# Patient Record
Sex: FEMALE | Race: WHITE
[De-identification: names, ages, dates, MRNs, and addresses within clinical notes are randomized per-mention and may not be internally consistent; named-entity substitution may affect disease eponyms.]

---

## 2017-12-19 ENCOUNTER — RX ONLY (RX ONLY)
Age: 37
End: 2017-12-19

## 2017-12-19 ENCOUNTER — DOCTOR'S OFFICE (OUTPATIENT)
Dept: URBAN - NONMETROPOLITAN AREA CLINIC 1 | Facility: CLINIC | Age: 37
Setting detail: OPHTHALMOLOGY
End: 2017-12-19
Payer: COMMERCIAL

## 2017-12-19 DIAGNOSIS — H52.13: ICD-10-CM

## 2017-12-19 DIAGNOSIS — Z01.00: ICD-10-CM

## 2017-12-19 PROCEDURE — 92014 COMPRE OPH EXAM EST PT 1/>: CPT | Performed by: OPTOMETRIST

## 2017-12-19 PROCEDURE — 92310 CONTACT LENS FITTING OU: CPT | Performed by: OPTOMETRIST

## 2017-12-19 ASSESSMENT — REFRACTION_CURRENTRX
OS_OVR_VA: 20/
OD_OVR_VA: 20/
OS_OVR_VA: 20/
OD_OVR_VA: 20/
OS_OVR_VA: 20/
OD_OVR_VA: 20/

## 2017-12-19 ASSESSMENT — REFRACTION_MANIFEST
OD_VA2: 20/
OS_VA1: 20/
OS_VA2: 20/
OD_VA3: 20/
OD_VA3: 20/
OD_VA1: 20/
OS_VA3: 20/
OU_VA: 20/
OU_VA: 20/
OS_VA2: 20/
OD_VA1: 20/
OS_VA1: 20/
OD_VA2: 20/
OS_VA3: 20/

## 2017-12-19 ASSESSMENT — VISUAL ACUITY
OS_BCVA: 20/20
OD_BCVA: 20/20

## 2017-12-19 ASSESSMENT — AXIALLENGTH_DERIVED: OS_AL: 23.7587

## 2017-12-19 ASSESSMENT — REFRACTION_OUTSIDERX
OS_CYLINDER: -0.50
OS_VA2: 20/20
OD_VA1: 20/20
OD_VA3: 20/
OD_SPHERE: -1.25
OS_VA1: 20/20
OS_VA3: 20/
OD_VA2: 20/20
OD_CYLINDER: SPH
OS_AXIS: 155
OU_VA: 20/
OS_SPHERE: -1.00

## 2017-12-19 ASSESSMENT — KERATOMETRY
METHOD_AUTO_MANUAL: AUTO
OS_AXISANGLE_DEGREES: 172
OD_K1POWER_DIOPTERS: 43.50
OD_K2POWER_DIOPTERS: 44.50
OS_K2POWER_DIOPTERS: 44.75
OS_K1POWER_DIOPTERS: 44.00
OD_AXISANGLE_DEGREES: 016

## 2017-12-19 ASSESSMENT — REFRACTION_AUTOREFRACTION
OS_AXIS: 157
OS_SPHERE: -1.00
OD_SPHERE: -1.25
OS_CYLINDER: -0.50

## 2017-12-19 ASSESSMENT — SPHEQUIV_DERIVED: OS_SPHEQUIV: -1.25

## 2017-12-19 ASSESSMENT — CONFRONTATIONAL VISUAL FIELD TEST (CVF)
OD_FINDINGS: FULL
OS_FINDINGS: FULL

## 2018-01-04 ENCOUNTER — OPTICAL OFFICE (OUTPATIENT)
Dept: URBAN - NONMETROPOLITAN AREA CLINIC 4 | Facility: CLINIC | Age: 38
Setting detail: OPHTHALMOLOGY
End: 2018-01-04
Payer: COMMERCIAL

## 2018-01-04 DIAGNOSIS — H52.13: ICD-10-CM

## 2018-01-04 PROCEDURE — S0500 DISPOS CONT LENS: HCPCS | Performed by: OPTOMETRIST

## 2024-08-02 ENCOUNTER — OFFICE VISIT (OUTPATIENT)
Dept: URGENT CARE | Facility: CLINIC | Age: 44
End: 2024-08-02
Payer: COMMERCIAL

## 2024-08-02 VITALS
SYSTOLIC BLOOD PRESSURE: 122 MMHG | TEMPERATURE: 98.4 F | RESPIRATION RATE: 17 BRPM | HEIGHT: 64 IN | HEART RATE: 91 BPM | WEIGHT: 170 LBS | BODY MASS INDEX: 29.02 KG/M2 | DIASTOLIC BLOOD PRESSURE: 62 MMHG | OXYGEN SATURATION: 98 %

## 2024-08-02 DIAGNOSIS — B35.3 TINEA PEDIS OF LEFT FOOT: Primary | ICD-10-CM

## 2024-08-02 PROCEDURE — S9083 URGENT CARE CENTER GLOBAL: HCPCS

## 2024-08-02 PROCEDURE — G0382 LEV 3 HOSP TYPE B ED VISIT: HCPCS

## 2024-08-02 RX ORDER — AMLODIPINE BESYLATE 2.5 MG/1
2.5 TABLET ORAL DAILY
COMMUNITY
Start: 2024-07-26 | End: 2024-10-24

## 2024-08-02 RX ORDER — CLOTRIMAZOLE AND BETAMETHASONE DIPROPIONATE 10; .64 MG/G; MG/G
CREAM TOPICAL 2 TIMES DAILY
Qty: 45 G | Refills: 0 | Status: SHIPPED | OUTPATIENT
Start: 2024-08-02

## 2024-08-02 NOTE — PROGRESS NOTES
"  [facility]        NAME: Sarah Ayoub is a 44 y.o. female  : 1980    MRN: [id number]  DATE: 2024  TIME: 10:57 AM    Assessment and Plan   Tinea pedis of left foot [B35.3]  1. Tinea pedis of left foot  clotrimazole-betamethasone (LOTRISONE) 1-0.05 % cream        Skin findings concerning for tinea pedis and will treat with clotrimazole-betamethasone. Encouraged continued supportive measures.  Follow up with PCP in 3-5 days or proceed to emergency department for worsening symptoms.  Patient verbalized understanding of instructions given.       Patient Instructions     Patient Instructions   Use cream as prescribed  Decrease moisture  Follow up with PCP in 3-5 days.  Proceed to  ER if symptoms worsen.    If tests have been performed at [facility], our office will contact you with results if changes need to be made to the care plan discussed with you at the visit.  You can review your full results on [facility] MyChart.      Patient Education     Ringworm, athlete's foot, and jock itch   The Basics   Written by the doctors and editors at [facility]   What are ringworm, athlete's foot, and jock itch? -- These are all skin infections caused by a fungus. These types of fungal infections are also called \"tinea.\"  Some people call these fungal infections \"ringworm.\" This is because they often cause a ring-shaped, red, itchy rash on the skin (picture 1). But a ring-shaped rash is not always there. Depending on where the infection is, it can look different:   People with athlete's foot might instead have moist, raw skin between their toes, or flaking skin on the bottoms of their feet (picture 2 and picture 3).   People with jock itch often just have a rash on their groin. It usually starts in the fold where the thigh meets the groin area .   Sometimes, especially in children, the fungus can infect the scalp. On the scalp, the infection can look like a bald spot or a round flaky patch of skin (picture 5 " and picture 6).  How did I get a fungal infection? -- You can catch fungal infections through skin-to-skin contact with a person who is infected. You can also catch them from an infected dog or cat.  You can also get the infections from places where the fungus might be, such as:   A shower stall   A locker room floor   The area near a pool  If you have a fungal infection on 1 part of your body, you can also spread it to other parts. For instance, a fungal infection on your feet could spread to your groin.  How are fungal infections treated? -- The treatment for a fungal infection depends on which body part is affected:   If you have a fungal infection on your scalp, you must take pills to kill the fungus. Treatment for scalp infections usually lasts 1 to 3 months.   If you have a fungal infection on your feet, groin, or another body part, most of the time, you will not need pills. Instead, you can use a special gel, cream, lotion, or powder to kill the fungus. Treatment with these products lasts 2 to 4 weeks.   If you have a fungal infection on your groin and on your feet, you must treat both infections at the same time. If you don't, the infection on your feet can spread to your groin again.  What problems should I watch for? -- If you are being treated for a fungal infection, call your doctor or nurse for advice if:   Your fungal infection spreads.   You have any of the following symptoms:   Fever of 100.4°F (38°C) or higher   Chills   Swelling, redness, or warmth around the infected area   Pain when touching the area   Your fungal infection doesn't go away after treatment.  How do I keep from getting a fungal infection again? -- If someone in your home has had a fungal infection on their scalp:   Get rid of any barr, brushes, barrettes, or other hair products that could have the fungus on them.   Make sure that a doctor or nurse checks everyone in the house for a fungal infection on the scalp. The doctor or  nurse might also suggest that everyone else in the house use an antifungal shampoo for a few weeks.   If the fungal infection might have come from a pet, have the pet checked by a vet.  Some other general tips to prevent fungal infections:   Do not share unwashed clothes, sports gear, or towels with other people.   Always wear slippers or sandals when at the gym, pool, or other public areas. This includes public showers.   Change your socks and underwear at least once a day.   Keep your skin clean and dry. Always dry yourself well after swimming or showering.  All topics are updated as new evidence becomes available and our peer review process is complete.  This topic retrieved from [employer] on: May 16, 2024.  Topic 75365 Version 11.0  Release: 32.4.3 - C32.135  © 2024 UpToDate, Inc. and/or its affiliates. All rights reserved.  picture 1: Ringworm     Ringworm can cause a ring-shaped, red, itchy rash on the skin.  Reproduced with permission from: www.[employer]. Copyright [employer]. All rights reserved.  Graphic 381324 Version 2.0  picture 2: Athlete's foot     Athlete's foot can cause moist, raw skin between the toes.  Reproduced with permission from: www.[employer]. Copyright [employer]. All rights reserved.  Graphic 305617 Version 2.0  picture 3: Athlete's foot     People with athlete's foot often have flaky skin on the bottoms of their feet.  Graphic 529349 Version 1.0  picture 5: Fungal infection of the scalp     This photo shows tinea capitis, a fungal infection of the scalp.  Reproduced with permission from: www.skinsight.com. Copyright [employer]. All rights reserved.  Graphic 776356 Version 3.0  picture 6: Fungal infection of the scalp     A fungal infection on the scalp can cause scaly patches and hair loss.  Reproduced with permission from: www.[employer]. Copyright [employer]. All rights reserved.  Graphic 628833 Version 2.0  Consumer Information Use and Disclaimer   Disclaimer: This generalized  information is a limited summary of diagnosis, treatment, and/or medication information. It is not meant to be comprehensive and should be used as a tool to help the user understand and/or assess potential diagnostic and treatment options. It does NOT include all information about conditions, treatments, medications, side effects, or risks that may apply to a specific patient. It is not intended to be medical advice or a substitute for the medical advice, diagnosis, or treatment of a health care provider based on the health care provider's examination and assessment of a patient's specific and unique circumstances. Patients must speak with a health care provider for complete information about their health, medical questions, and treatment options, including any risks or benefits regarding use of medications. This information does not endorse any treatments or medications as safe, effective, or approved for treating a specific patient. UpToDate, Inc. and its affiliates disclaim any warranty or liability relating to this information or the use thereof.The use of this information is governed by the Terms of Use, available at https://www.FOBO.com/en/know/clinical-effectiveness-terms. 2024© UpToDate, Inc. and its affiliates and/or licensors. All rights reserved.  Copyright   © 2024 UpToDate, Inc. and/or its affiliates. All rights reserved.        Chief Complaint     Chief Complaint   Patient presents with    Rash     Rash on left great toe X 2 months. Noticed past 2 weeks spreading under toe. Using neosporin cream . Rash is itchy         History of Present Illness       44-year-old female with a past medical history significant for hypertension presents with rash to left foot, specifically left great toe.  Patient reports noticing rash approximately 2 months ago however acutely worsened x 2 weeks.  Reports itching and so she has been applying topical Neosporin ointment.  Reports some dryness and flaking of skin.  " Denies fever, chills, or flulike symptoms.         Review of Systems   Review of Systems   Constitutional:  Negative for chills and fever.   HENT:  Negative for congestion, ear discharge, ear pain, rhinorrhea and sore throat.    Eyes:  Negative for discharge.   Respiratory:  Negative for cough and shortness of breath.    Cardiovascular:  Negative for chest pain.   Gastrointestinal:  Negative for abdominal pain, diarrhea, nausea and vomiting.   Musculoskeletal:  Negative for arthralgias and myalgias.   Skin:  Positive for rash.   Neurological:  Negative for weakness and numbness.         Current Medications       Current Outpatient Medications:     amLODIPine (NORVASC) 2.5 mg tablet, Take 2.5 mg by mouth daily, Disp: , Rfl:     clotrimazole-betamethasone (LOTRISONE) 1-0.05 % cream, Apply topically 2 (two) times a day, Disp: 45 g, Rfl: 0    Current Allergies     Allergies as of 08/02/2024    (No Known Allergies)            The following portions of the patient's history were reviewed and updated as appropriate: allergies, current medications, past family history, past medical history, past social history, past surgical history and problem list.     Past Medical History:   Diagnosis Date    Hypertension        Past Surgical History:   Procedure Laterality Date    HYSTERECTOMY         Family History   Problem Relation Age of Onset    Hypertension Mother     Cancer Father          Medications have been verified.        Objective   /62   Pulse 91   Temp 98.4 °F (36.9 °C)   Resp 17   Ht 5' 4\" (1.626 m)   Wt 77.1 kg (170 lb)   SpO2 98%   BMI 29.18 kg/m²   No LMP recorded. Patient has had a hysterectomy.       Physical Exam     Physical Exam  Vitals and nursing note reviewed.   Constitutional:       General: She is not in acute distress.     Appearance: She is not toxic-appearing.   HENT:      Head: Normocephalic.   Eyes:      Conjunctiva/sclera: Conjunctivae normal.   Pulmonary:      Effort: Pulmonary effort " is normal.   Musculoskeletal:         General: Normal range of motion.   Skin:     General: Skin is warm and dry.      Comments: Erythematous and dry/flaking skin noted to left great toe that extends between webbing and over medial aspect of toe without drainage    Neurological:      Mental Status: She is alert and oriented to person, place, and time.      Sensory: Sensation is intact.      Motor: Motor function is intact.      Gait: Gait is intact.   Psychiatric:         Mood and Affect: Mood normal.         Behavior: Behavior normal.

## 2024-08-02 NOTE — PATIENT INSTRUCTIONS
"Use cream as prescribed  Decrease moisture  Follow up with PCP in 3-5 days.  Proceed to  ER if symptoms worsen.    If tests have been performed at [facility], our office will contact you with results if changes need to be made to the care plan discussed with you at the visit.  You can review your full results on [facility] MyChart.      Patient Education     Ringworm, athlete's foot, and jock itch   The Basics   Written by the doctors and editors at [facility]   What are ringworm, athlete's foot, and jock itch? -- These are all skin infections caused by a fungus. These types of fungal infections are also called \"tinea.\"  Some people call these fungal infections \"ringworm.\" This is because they often cause a ring-shaped, red, itchy rash on the skin (picture 1). But a ring-shaped rash is not always there. Depending on where the infection is, it can look different:   People with athlete's foot might instead have moist, raw skin between their toes, or flaking skin on the bottoms of their feet (picture 2 and picture 3).   People with jock itch often just have a rash on their groin. It usually starts in the fold where the thigh meets the groin area .   Sometimes, especially in children, the fungus can infect the scalp. On the scalp, the infection can look like a bald spot or a round flaky patch of skin (picture 5 and picture 6).  How did I get a fungal infection? -- You can catch fungal infections through skin-to-skin contact with a person who is infected. You can also catch them from an infected dog or cat.  You can also get the infections from places where the fungus might be, such as:   A shower stall   A locker room floor   The area near a pool  If you have a fungal infection on 1 part of your body, you can also spread it to other parts. For instance, a fungal infection on your feet could spread to your groin.  How are fungal infections treated? -- The treatment for a fungal infection depends on which body part is " affected:   If you have a fungal infection on your scalp, you must take pills to kill the fungus. Treatment for scalp infections usually lasts 1 to 3 months.   If you have a fungal infection on your feet, groin, or another body part, most of the time, you will not need pills. Instead, you can use a special gel, cream, lotion, or powder to kill the fungus. Treatment with these products lasts 2 to 4 weeks.   If you have a fungal infection on your groin and on your feet, you must treat both infections at the same time. If you don't, the infection on your feet can spread to your groin again.  What problems should I watch for? -- If you are being treated for a fungal infection, call your doctor or nurse for advice if:   Your fungal infection spreads.   You have any of the following symptoms:   Fever of 100.4°F (38°C) or higher   Chills   Swelling, redness, or warmth around the infected area   Pain when touching the area   Your fungal infection doesn't go away after treatment.  How do I keep from getting a fungal infection again? -- If someone in your home has had a fungal infection on their scalp:   Get rid of any barr, brushes, barrettes, or other hair products that could have the fungus on them.   Make sure that a doctor or nurse checks everyone in the house for a fungal infection on the scalp. The doctor or nurse might also suggest that everyone else in the house use an antifungal shampoo for a few weeks.   If the fungal infection might have come from a pet, have the pet checked by a vet.  Some other general tips to prevent fungal infections:   Do not share unwashed clothes, sports gear, or towels with other people.   Always wear slippers or sandals when at the gym, pool, or other public areas. This includes public showers.   Change your socks and underwear at least once a day.   Keep your skin clean and dry. Always dry yourself well after swimming or showering.  All topics are updated as new evidence becomes  available and our peer review process is complete.  This topic retrieved from [employer] on: May 16, 2024.  Topic 09241 Version 11.0  Release: 32.4.3 - C32.135  © 2024 UpToDate, Inc. and/or its affiliates. All rights reserved.  picture 1: Ringworm     Ringworm can cause a ring-shaped, red, itchy rash on the skin.  Reproduced with permission from: www.[employer]. Copyright [employer]. All rights reserved.  Graphic 738708 Version 2.0  picture 2: Athlete's foot     Athlete's foot can cause moist, raw skin between the toes.  Reproduced with permission from: www.[employer]. Copyright [employer]. All rights reserved.  Graphic 465006 Version 2.0  picture 3: Athlete's foot     People with athlete's foot often have flaky skin on the bottoms of their feet.  Graphic 185259 Version 1.0  picture 5: Fungal infection of the scalp     This photo shows tinea capitis, a fungal infection of the scalp.  Reproduced with permission from: www.[employer]. Copyright [employer]. All rights reserved.  Graphic 373666 Version 3.0  picture 6: Fungal infection of the scalp     A fungal infection on the scalp can cause scaly patches and hair loss.  Reproduced with permission from: www.[employer]. Copyright [employer]. All rights reserved.  Graphic 481169 Version 2.0  Consumer Information Use and Disclaimer   Disclaimer: This generalized information is a limited summary of diagnosis, treatment, and/or medication information. It is not meant to be comprehensive and should be used as a tool to help the user understand and/or assess potential diagnostic and treatment options. It does NOT include all information about conditions, treatments, medications, side effects, or risks that may apply to a specific patient. It is not intended to be medical advice or a substitute for the medical advice, diagnosis, or treatment of a health care provider based on the health care provider's examination and assessment of a patient's specific and unique circumstances.  Patients must speak with a health care provider for complete information about their health, medical questions, and treatment options, including any risks or benefits regarding use of medications. This information does not endorse any treatments or medications as safe, effective, or approved for treating a specific patient. UpToDate, Inc. and its affiliates disclaim any warranty or liability relating to this information or the use thereof.The use of this information is governed by the Terms of Use, available at https://www.[employer].com/en/know/clinical-effectiveness-terms. 2024© UpToDate, Inc. and its affiliates and/or licensors. All rights reserved.  Copyright   © 2024 UpToDate, Inc. and/or its affiliates. All rights reserved.

## 2025-01-25 ENCOUNTER — OFFICE VISIT (OUTPATIENT)
Dept: URGENT CARE | Facility: CLINIC | Age: 45
End: 2025-01-25
Payer: COMMERCIAL

## 2025-01-25 VITALS
RESPIRATION RATE: 18 BRPM | DIASTOLIC BLOOD PRESSURE: 96 MMHG | WEIGHT: 180 LBS | TEMPERATURE: 97 F | SYSTOLIC BLOOD PRESSURE: 128 MMHG | BODY MASS INDEX: 30.73 KG/M2 | HEIGHT: 64 IN | OXYGEN SATURATION: 91 % | HEART RATE: 91 BPM

## 2025-01-25 DIAGNOSIS — J01.00 ACUTE NON-RECURRENT MAXILLARY SINUSITIS: Primary | ICD-10-CM

## 2025-01-25 PROCEDURE — G0382 LEV 3 HOSP TYPE B ED VISIT: HCPCS

## 2025-01-25 PROCEDURE — S9083 URGENT CARE CENTER GLOBAL: HCPCS

## 2025-01-25 RX ORDER — METHYLPREDNISOLONE 4 MG/1
TABLET ORAL
Qty: 1 EACH | Refills: 0 | Status: SHIPPED | OUTPATIENT
Start: 2025-01-25

## 2025-01-25 RX ORDER — FLUTICASONE PROPIONATE 50 MCG
2 SPRAY, SUSPENSION (ML) NASAL DAILY
Qty: 11.1 ML | Refills: 0 | Status: SHIPPED | OUTPATIENT
Start: 2025-01-25

## 2025-01-25 NOTE — PROGRESS NOTES
[facility]        NAME: Sarah Ayoub is a 44 y.o. female  : 1980    MRN: [id number]  DATE: 2025  TIME: 11:51 AM    Assessment and Plan   Acute non-recurrent maxillary sinusitis [J01.00]  1. Acute non-recurrent maxillary sinusitis  fluticasone (FLONASE) 50 mcg/act nasal spray    methylPREDNISolone 4 MG tablet therapy pack            Patient Instructions       Follow up with PCP in 3-5 days.  Proceed to  ER if symptoms worsen.    If tests are performed, our office will contact you with results only if changes need to made to the care plan discussed with you at the visit. You can review your full results on [facility].    Chief Complaint     Chief Complaint   Patient presents with   • Ear Fullness     C/o bilateral ear fullness, left ear is worse. Congestion. Onset x2 weeks ago.          History of Present Illness       44-year-old female presents with c/o bilateral ear fullness, left ear is worse. Congestion. Onset x2 weeks ago.  Was seen by family practice for this issue and was diagnosed with congestion and recommended to take Claritin daily.  Has been on Claritin for the last week with no improvement.  Denies any fevers or chills and is eating and drinking normally.  Ports mild cough complaints from postnasal drip but otherwise states she feels well    Ear Fullness   Associated symptoms include headaches and rhinorrhea. Pertinent negatives include no abdominal pain, coughing, diarrhea, sore throat or vomiting.       Review of Systems   Review of Systems   Constitutional:  Negative for appetite change, chills, fatigue and fever.   HENT:  Positive for congestion, ear pain, postnasal drip, rhinorrhea, sinus pressure and sinus pain. Negative for sore throat.    Respiratory:  Negative for cough, shortness of breath, wheezing and stridor.    Cardiovascular:  Negative for chest pain and palpitations.   Gastrointestinal:  Negative for abdominal pain, constipation, diarrhea, nausea  "and vomiting.   Neurological:  Positive for headaches. Negative for dizziness, syncope and light-headedness.         Current Medications       Current Outpatient Medications:   •  fluticasone (FLONASE) 50 mcg/act nasal spray, 2 sprays into each nostril daily, Disp: 11.1 mL, Rfl: 0  •  methylPREDNISolone 4 MG tablet therapy pack, Use as directed on package, Disp: 1 each, Rfl: 0  •  amLODIPine (NORVASC) 2.5 mg tablet, Take 2.5 mg by mouth daily, Disp: , Rfl:   •  clotrimazole-betamethasone (LOTRISONE) 1-0.05 % cream, Apply topically 2 (two) times a day (Patient not taking: Reported on 1/25/2025), Disp: 45 g, Rfl: 0    Current Allergies     Allergies as of 01/25/2025   • (No Known Allergies)            The following portions of the patient's history were reviewed and updated as appropriate: allergies, current medications, past family history, past medical history, past social history, past surgical history and problem list.     Past Medical History:   Diagnosis Date   • Hypertension    • Migraine        Past Surgical History:   Procedure Laterality Date   • HYSTERECTOMY         Family History   Problem Relation Age of Onset   • Hypertension Mother    • Cancer Father          Medications have been verified.        Objective   /96   Pulse 91   Temp (!) 97 °F (36.1 °C)   Resp 18   Ht 5' 4\" (1.626 m)   Wt 81.6 kg (180 lb)   SpO2 91%   BMI 30.90 kg/m²        Physical Exam     Physical Exam  Vitals and nursing note reviewed.   Constitutional:       General: She is not in acute distress.     Appearance: Normal appearance. She is normal weight. She is not ill-appearing, toxic-appearing or diaphoretic.   HENT:      Head: Normocephalic and atraumatic.      Right Ear: Tympanic membrane, ear canal and external ear normal. There is no impacted cerumen.      Left Ear: Tympanic membrane, ear canal and external ear normal. There is no impacted cerumen.      Ears:      Comments: Bilateral TM effusion     Nose: Congestion " and rhinorrhea present.      Mouth/Throat:      Mouth: Mucous membranes are moist.      Pharynx: Oropharynx is clear. No oropharyngeal exudate or posterior oropharyngeal erythema.   Eyes:      General: No scleral icterus.        Right eye: No discharge.         Left eye: No discharge.      Extraocular Movements: Extraocular movements intact.      Conjunctiva/sclera: Conjunctivae normal.      Pupils: Pupils are equal, round, and reactive to light.   Neck:      Vascular: No carotid bruit.   Cardiovascular:      Rate and Rhythm: Normal rate and regular rhythm.      Pulses: Normal pulses.      Heart sounds: Normal heart sounds. No murmur heard.     No friction rub. No gallop.   Pulmonary:      Effort: Pulmonary effort is normal. No respiratory distress.      Breath sounds: Normal breath sounds. No stridor. No wheezing, rhonchi or rales.   Chest:      Chest wall: No tenderness.   Musculoskeletal:      Cervical back: Normal range of motion and neck supple. No rigidity or tenderness.   Lymphadenopathy:      Cervical: No cervical adenopathy.   Neurological:      Mental Status: She is alert.